# Patient Record
Sex: FEMALE | Race: WHITE
[De-identification: names, ages, dates, MRNs, and addresses within clinical notes are randomized per-mention and may not be internally consistent; named-entity substitution may affect disease eponyms.]

---

## 2017-02-07 NOTE — ERD
ER Documentation


Chief Complaint


Date/Time


DATE: 2/7/17 


TIME: 03:11


Chief Complaint


itchy generalized rash/ hoarse voice/ lightheaded 1 hr PTA, no distress





HPI


21-year-old female presents here in emergency department for complaint of rash 

all over the body and itching, itching in the throat, hoarseness of the voice 

today. Patient broke out in rash after eating candy. Patient denies any 

stridor. Patient started to feel lightheaded and dizzy 30 minutes prior to 

arrival, patient is complaining of headache, throbbing pain, for/10 scale, 

accompanied with dizziness and lightheadedness. Patient denies any nausea or 

vomiting. Patient takes drugs regularly, took heroine and crystal 

methamphetamine today.





ROS


All systems reviewed and are negative except as per history of present illness.





Medications


Home Meds


No Active Prescriptions or Reported Meds





Allergies


Allergies:  


Coded Allergies:  


     No Known Allergy (Unverified , 3/7/16)





PMhx/Soc


History of Surgery:  No


Anesthesia Reaction:  No


Hx Neurological Disorder:  No


Hx Respiratory Disorders:  No


Hx Cardiac Disorders:  No


Hx Psychiatric Problems:  Yes (pt states depressed easily)


Hx Miscellaneous Medical Probl:  No


Hx Alcohol Use:  Yes (has drank in past)


Hx Substance Use:  Yes (oxycotin last used 3 weeks ago)


Hx Tobacco Use:  Yes (last smoked 3 weeks ago)





Physical Exam


Vitals





Vital Signs








  Date Time  Temp Pulse Resp B/P Pulse Ox O2 Delivery O2 Flow Rate FiO2


 


2/6/17 23:34 97.2 96 20 136/62 97   








Physical Exam


Const:  []


Head:   Atraumatic 


Eyes:    Normal Conjunctiva


ENT:    Normal External Ears, Nose and Mouth.


Neck:               Full range of motion..~ No meningismus.


Resp:    Clear to auscultation bilaterally


Cardio:    Regular rate and rhythm, no murmurs


Abd:    Soft, non tender, non distended. Normal bowel sounds


Skin:    No petechiae or rashes


Back:    No midline or flank tenderness


Ext:    No cyanosis, or edema


Neur:    Awake and alert


Psych:    Normal Mood and Affect


Result Diagram:  


2/7/17 0320                                                                    

            2/7/17 0320





Results 24 hrs





 Laboratory Tests








Test


  2/7/17


03:20


 


Alanine Aminotransferase


(ALT/SGPT) 20IU/L 


 


 


Albumin 4.1g/dl 


 


Albumin/Globulin Ratio 1.02 


 


Alkaline Phosphatase 113IU/L 


 


Anion Gap 18 


 


Aspartate Amino Transf


(AST/SGOT) 22IU/L 


 


 


Basophils # 0.210^3/ul 


 


Basophils % 1.4% 


 


Blood Morphology Comment  


 


Blood Urea Nitrogen 8mg/dl 


 


Calcium Level 9.5mg/dl 


 


Carbon Dioxide Level 30mmol/L 


 


Chloride Level 98mmol/L 


 


Creatinine 0.56mg/dl 


 


Direct Bilirubin 0.00mg/dl 


 


Eosinophils # 0.210^3/ul 


 


Eosinophils % 2.2% 


 


Globulin 4.00g/dl 


 


Glucose Level 82mg/dl 


 


Hematocrit 36.4% 


 


Hemoglobin 12.1g/dl 


 


Indirect Bilirubin 0.0mg/dl 


 


Lymphocytes # 3.210^3/ul 


 


Lymphocytes % 29.8% 


 


Mean Corpuscular Hemoglobin 27.9pg 


 


Mean Corpuscular Hemoglobin


Concent 33.3g/dl 


 


 


Mean Corpuscular Volume 83.8fl 


 


Mean Platelet Volume 7.2fl 


 


Monocytes # 1.210^3/ul 


 


Monocytes % 11.3% 


 


Neutrophils # 6.010^3/ul 


 


Neutrophils % 55.3% 


 


Nucleated Red Blood Cells # 0.010^3/ul 


 


Nucleated Red Blood Cells % 0.0/100WBC 


 


Platelet Count 00641^3/UL 


 


Potassium Level 3.3mmol/L 


 


Red Blood Count 4.3510^6/ul 


 


Red Cell Distribution Width 15.6% 


 


Sodium Level 143mmol/L 


 


Total Bilirubin 0.0mg/dl 


 


Total Protein 8.1g/dl 


 


White Blood Count 10.810^3/ul 








 Current Medications








 Medications


  (Trade)  Dose


 Ordered  Sig/Sun


 Route


 PRN Reason  Start Time


 Stop Time Status Last Admin


Dose Admin


 


 Diphenhydramine


 HCl


  (Benadryl)  50 mg  ONCE  ONCE


 IM


   2/7/17 03:30


 2/7/17 03:31 DC 2/7/17 03:33


 





Benadryl was given here in emergency department to help with itching and the 

rash.


EKG was done, read by me and is normal sinus rhythm at a rate of 82, normal axis

, there is no ST changes or changes in the EKG that indicates any cardiac 

emergencies at this time. Patient's EKG was also reviewed by Dr. Cote. 

Impression: no acute findings on EKG





PROCEDURE:   CT Brain without contrast. 


 


CLINICAL INDICATION: Headache and dizziness


 


TECHNIQUE:   Axial images from the skull base through the vertex without IV 

contrast.  Multiplanar reformatted images were made.  Images were reviewed on a 

PACS workstation.  The CTDIvol is 45.01 mGy and the DLP is 720.23 mGycm.  One 

or more of the following dose reduction techniques were used: automated 

exposure control, adjustment of the mA and/or kV according to patient size, or 

use of iterative reconstruction technique.


 


COMPARISON:   06/01/2013 


 


FINDINGS:


The ventricles and cisterns are normal for age.  There is no evidence for 

territorial infarction or intracranial hemorrhage.  No mass or midline shift is 

seen.  No extra-axial fluid collection is seen.   The visualized paranasal 

sinuses and mastoids are clear. 


 


IMPRESSION:


No definite acute intracranial abnormality.


 


 


 


RPTAT: HLBE


_____________________________________________ 


Tiarra Ordaz Physician           Date    Time 


Electronically viewed and signed by Tiarra Ordaz Physician on 02/07/2017 04

:06 


 


D:  02/07/2017 04:06  T:  02/07/2017 04:06


LE/





Procedures/MDM


Medical Decision Making: Patient's symptoms of lightheadedness and dizziness 

nonspecific at this time, possibly related to drug abuse, patient's been on 

heroin and crystal meth. Can be side effects. There is low suspicion for 

neurological emergencies at this time since patients neurologic exam is normal. 

Patient did not have any altered level consciousness, vomiting, changes in 

balance or memory after incident. Patients CT scan of the head does not show 

any neurological emergencies at this time. Patient's rash all over the body 

consistent with urticaria, allergic reaction, unknown source at this time. No 

symptoms of anaphylactic shock. No symptoms of respiratory distress. No 

angioedema noted. No symptoms of any contagious rash at this time. Patient was 

given for Benadryl, prednisone, is advised to follow-up with primary care 

doctor to 3 days for reevaluation of symptoms. Patient was advised to return to 

emergency department for worsening symptoms





Departure


Diagnosis:  


 Primary Impression:  


 Dizziness


 Additional Impression:  


 Urticaria


Condition:  Stable


Patient Instructions:  Dizziness, Unk Cause, Hives











CORINNE ARRIAGA NP Feb 7, 2017 03:14

## 2017-02-07 NOTE — RADRPT
PROCEDURE:   CT Brain without contrast. 

 

CLINICAL INDICATION: Headache and dizziness

 

TECHNIQUE:   Axial images from the skull base through the vertex without IV contrast.  Multiplanar r
eformatted images were made.  Images were reviewed on a PACS workstation.  The CTDIvol is 45.01 mGy 
and the DLP is 720.23 mGycm.  One or more of the following dose reduction techniques were used: auto
mated exposure control, adjustment of the mA and/or kV according to patient size, or use of iterativ
e reconstruction technique.

 

COMPARISON:   06/01/2013 

 

FINDINGS:

The ventricles and cisterns are normal for age.  There is no evidence for territorial infarction or 
intracranial hemorrhage.  No mass or midline shift is seen.  No extra-axial fluid collection is seen
.   The visualized paranasal sinuses and mastoids are clear. 

 

IMPRESSION:

No definite acute intracranial abnormality.

 

 

 

RPTAT: HLBE

_____________________________________________ 

Physician Hill           Date    Time 

Electronically viewed and signed by Physician Hill on 02/07/2017 04:06 

 

D:  02/07/2017 04:06  T:  02/07/2017 04:06

LUCIO/

## 2018-06-10 ENCOUNTER — HOSPITAL ENCOUNTER (EMERGENCY)
Age: 23
LOS: 1 days | Discharge: HOME | End: 2018-06-11

## 2018-06-10 ENCOUNTER — HOSPITAL ENCOUNTER (EMERGENCY)
Dept: HOSPITAL 91 - E/R | Age: 23
LOS: 1 days | Discharge: HOME | End: 2018-06-11
Payer: MEDICAID
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
## 2018-06-10 DIAGNOSIS — S41.141A: Primary | ICD-10-CM

## 2018-06-10 DIAGNOSIS — F17.210: ICD-10-CM

## 2018-06-10 PROCEDURE — 73080 X-RAY EXAM OF ELBOW: CPT

## 2018-06-10 PROCEDURE — 99283 EMERGENCY DEPT VISIT LOW MDM: CPT

## 2018-07-15 ENCOUNTER — HOSPITAL ENCOUNTER (INPATIENT)
Dept: HOSPITAL 91 - E/R | Age: 23
LOS: 3 days | Discharge: LEFT BEFORE DISCHARGE | DRG: 872 | End: 2018-07-18
Payer: MEDICAID

## 2018-07-15 DIAGNOSIS — F11.10: ICD-10-CM

## 2018-07-15 DIAGNOSIS — D64.9: ICD-10-CM

## 2018-07-15 DIAGNOSIS — L03.113: ICD-10-CM

## 2018-07-15 DIAGNOSIS — F17.200: ICD-10-CM

## 2018-07-15 DIAGNOSIS — A41.9: Primary | ICD-10-CM

## 2018-07-15 DIAGNOSIS — L02.413: ICD-10-CM

## 2018-07-15 DIAGNOSIS — F15.10: ICD-10-CM

## 2018-07-15 DIAGNOSIS — N39.0: ICD-10-CM

## 2018-07-15 PROCEDURE — 83540 ASSAY OF IRON: CPT

## 2018-07-15 PROCEDURE — 84439 ASSAY OF FREE THYROXINE: CPT

## 2018-07-15 PROCEDURE — 80202 ASSAY OF VANCOMYCIN: CPT

## 2018-07-15 PROCEDURE — 87070 CULTURE OTHR SPECIMN AEROBIC: CPT

## 2018-07-15 PROCEDURE — 85651 RBC SED RATE NONAUTOMATED: CPT

## 2018-07-15 PROCEDURE — 84100 ASSAY OF PHOSPHORUS: CPT

## 2018-07-15 PROCEDURE — 87075 CULTR BACTERIA EXCEPT BLOOD: CPT

## 2018-07-15 PROCEDURE — 80061 LIPID PANEL: CPT

## 2018-07-15 PROCEDURE — 81001 URINALYSIS AUTO W/SCOPE: CPT

## 2018-07-15 PROCEDURE — 87040 BLOOD CULTURE FOR BACTERIA: CPT

## 2018-07-15 PROCEDURE — 80048 BASIC METABOLIC PNL TOTAL CA: CPT

## 2018-07-15 PROCEDURE — 80307 DRUG TEST PRSMV CHEM ANLYZR: CPT

## 2018-07-15 PROCEDURE — 87086 URINE CULTURE/COLONY COUNT: CPT

## 2018-07-15 PROCEDURE — 73200 CT UPPER EXTREMITY W/O DYE: CPT

## 2018-07-15 PROCEDURE — 83036 HEMOGLOBIN GLYCOSYLATED A1C: CPT

## 2018-07-15 PROCEDURE — 93005 ELECTROCARDIOGRAM TRACING: CPT

## 2018-07-15 PROCEDURE — 76536 US EXAM OF HEAD AND NECK: CPT

## 2018-07-15 PROCEDURE — 84443 ASSAY THYROID STIM HORMONE: CPT

## 2018-07-15 PROCEDURE — 83605 ASSAY OF LACTIC ACID: CPT

## 2018-07-15 PROCEDURE — 99285 EMERGENCY DEPT VISIT HI MDM: CPT

## 2018-07-15 PROCEDURE — 71045 X-RAY EXAM CHEST 1 VIEW: CPT

## 2018-07-15 PROCEDURE — 84703 CHORIONIC GONADOTROPIN ASSAY: CPT

## 2018-07-15 PROCEDURE — 80053 COMPREHEN METABOLIC PANEL: CPT

## 2018-07-15 PROCEDURE — 85025 COMPLETE CBC W/AUTO DIFF WBC: CPT

## 2018-07-15 PROCEDURE — 82728 ASSAY OF FERRITIN: CPT

## 2018-07-15 PROCEDURE — 85610 PROTHROMBIN TIME: CPT

## 2018-07-15 PROCEDURE — 85730 THROMBOPLASTIN TIME PARTIAL: CPT

## 2018-07-15 PROCEDURE — 73090 X-RAY EXAM OF FOREARM: CPT

## 2018-07-15 PROCEDURE — 86140 C-REACTIVE PROTEIN: CPT

## 2018-07-15 PROCEDURE — 84484 ASSAY OF TROPONIN QUANT: CPT

## 2018-07-15 PROCEDURE — 36415 COLL VENOUS BLD VENIPUNCTURE: CPT

## 2018-07-15 PROCEDURE — 83735 ASSAY OF MAGNESIUM: CPT

## 2018-07-16 ENCOUNTER — HOSPITAL ENCOUNTER (INPATIENT)
Age: 23
LOS: 2 days | Discharge: LEFT BEFORE DISCHARGE | DRG: 872 | End: 2018-07-18

## 2018-07-16 LAB
% IRON SATURATION: 6 % SAT (ref 22–52)
ABNORMAL IP MESSAGE: 1
ABNORMAL IP MESSAGE: 1
ADD MAN DIFF?: NO
ADD MAN DIFF?: NO
ADD UMIC: YES
ALANINE AMINOTRANSFERASE: 17 IU/L (ref 13–69)
ALANINE AMINOTRANSFERASE: 18 IU/L (ref 13–69)
ALBUMIN/GLOBULIN RATIO: 1.02
ALBUMIN/GLOBULIN RATIO: 1.09
ALBUMIN: 3.6 G/DL (ref 3.3–4.9)
ALBUMIN: 3.7 G/DL (ref 3.3–4.9)
ALKALINE PHOSPHATASE: 113 IU/L (ref 42–121)
ALKALINE PHOSPHATASE: 97 IU/L (ref 42–121)
AMPHETAMINE/METHAMPHETAMINE: POSITIVE
ANION GAP: 13 (ref 8–16)
ANION GAP: 14 (ref 8–16)
ASPARTATE AMINO TRANSFERASE: 16 IU/L (ref 15–46)
ASPARTATE AMINO TRANSFERASE: 22 IU/L (ref 15–46)
BARBITURATES: NEGATIVE
BASOPHIL #: 0 10^3/UL (ref 0–0.1)
BASOPHIL #: 0 10^3/UL (ref 0–0.1)
BASOPHILS %: 0.3 % (ref 0–2)
BASOPHILS %: 0.3 % (ref 0–2)
BENZODIAZEPINES: NEGATIVE
BILIRUBIN,DIRECT: 0 MG/DL (ref 0–0.2)
BILIRUBIN,DIRECT: 0 MG/DL (ref 0–0.2)
BILIRUBIN,TOTAL: 0.3 MG/DL (ref 0.2–1.3)
BILIRUBIN,TOTAL: 0.4 MG/DL (ref 0.2–1.3)
BLOOD UREA NITROGEN: 6 MG/DL (ref 7–20)
BLOOD UREA NITROGEN: 7 MG/DL (ref 7–20)
C-REACTIVE PROTEIN: 14.6 MG/DL (ref 0–0.9)
CALCIUM: 8.3 MG/DL (ref 8.4–10.2)
CALCIUM: 8.7 MG/DL (ref 8.4–10.2)
CANNABINOIDS: NEGATIVE
CARBON DIOXIDE: 25 MMOL/L (ref 21–31)
CARBON DIOXIDE: 26 MMOL/L (ref 21–31)
CHLORIDE: 101 MMOL/L (ref 97–110)
CHLORIDE: 105 MMOL/L (ref 97–110)
CHOL/HDL RATIO: 3.6 RATIO
CHOLESTEROL: 76 MG/DL (ref 100–200)
COCAINE: NEGATIVE
CREATININE: 0.58 MG/DL (ref 0.44–1)
CREATININE: 0.59 MG/DL (ref 0.44–1)
EOSINOPHILS #: 0 10^3/UL (ref 0–0.5)
EOSINOPHILS #: 0 10^3/UL (ref 0–0.5)
EOSINOPHILS %: 0.3 % (ref 0–7)
EOSINOPHILS %: 0.3 % (ref 0–7)
ERYTHROCYTE SEDIMENTATION RATE: 56 MM/HR (ref 0–20)
ETHANOL: < 10 MG/DL
FERRITIN: 86.6 NG/ML (ref 6.2–137)
FREE T4 (FREE THYROXINE): 1.21 NG/DL (ref 0.79–2.35)
GLOBULIN: 3.3 G/DL (ref 1.3–3.2)
GLOBULIN: 3.6 G/DL (ref 1.3–3.2)
GLUCOSE: 112 MG/DL (ref 70–220)
GLUCOSE: 91 MG/DL (ref 70–220)
HDL CHOLESTEROL: 21 MG/DL (ref 33–83)
HEMATOCRIT: 28.3 % (ref 37–47)
HEMATOCRIT: 29.6 % (ref 37–47)
HEMOGLOBIN A1C: 5.4 % (ref 0–5.9)
HEMOGLOBIN: 9.3 G/DL (ref 12–16)
HEMOGLOBIN: 9.5 G/DL (ref 12–16)
INR: 1.31
IRON: 16 UG/DL (ref 35–150)
LACTIC ACID: 1 MMOL/L (ref 0.5–2)
LACTIC ACID: 1 MMOL/L (ref 0.5–2)
LACTIC ACID: 1.1 MMOL/L (ref 0.5–2)
LDL CHOLESTEROL,CALCULATED: 41 MG/DL
LYMPHOCYTES #: 2.6 10^3/UL (ref 0.8–2.9)
LYMPHOCYTES #: 2.7 10^3/UL (ref 0.8–2.9)
LYMPHOCYTES %: 16.5 % (ref 15–51)
LYMPHOCYTES %: 17.8 % (ref 15–51)
MAGNESIUM: 1.8 MG/DL (ref 1.7–2.5)
MEAN CORPUSCULAR HEMOGLOBIN: 27 PG (ref 29–33)
MEAN CORPUSCULAR HEMOGLOBIN: 27.1 PG (ref 29–33)
MEAN CORPUSCULAR HGB CONC: 32.1 G/DL (ref 32–37)
MEAN CORPUSCULAR HGB CONC: 32.9 G/DL (ref 32–37)
MEAN CORPUSCULAR VOLUME: 82.5 FL (ref 82–101)
MEAN CORPUSCULAR VOLUME: 84.1 FL (ref 82–101)
MEAN PLATELET VOLUME: 9.2 FL (ref 7.4–10.4)
MEAN PLATELET VOLUME: 9.7 FL (ref 7.4–10.4)
MONOCYTE #: 1.8 10^3/UL (ref 0.3–0.9)
MONOCYTE #: 1.9 10^3/UL (ref 0.3–0.9)
MONOCYTES %: 11.8 % (ref 0–11)
MONOCYTES %: 12.1 % (ref 0–11)
NEUTROPHIL #: 10.6 10^3/UL (ref 1.6–7.5)
NEUTROPHIL #: 10.9 10^3/UL (ref 1.6–7.5)
NEUTROPHILS %: 68.9 % (ref 39–77)
NEUTROPHILS %: 70.6 % (ref 39–77)
NUCLEATED RED BLOOD CELLS #: 0 10^3/UL (ref 0–0)
NUCLEATED RED BLOOD CELLS #: 0 10^3/UL (ref 0–0)
NUCLEATED RED BLOOD CELLS%: 0 /100WBC (ref 0–0)
NUCLEATED RED BLOOD CELLS%: 0 /100WBC (ref 0–0)
OPIATES: POSITIVE
PARTIAL THROMBOPLASTIN TIME: 38.3 SEC (ref 25–35)
PLATELET COUNT: 377 10^3/UL (ref 140–415)
PLATELET COUNT: 386 10^3/UL (ref 140–415)
POSITIVE DIFF: (no result)
POSITIVE DIFF: (no result)
POTASSIUM: 3.2 MMOL/L (ref 3.5–5.1)
POTASSIUM: 3.7 MMOL/L (ref 3.5–5.1)
PROTIME: 16.5 SEC (ref 11.9–14.9)
PT RATIO: 1.3
RED BLOOD COUNT: 3.43 10^6/UL (ref 4.2–5.4)
RED BLOOD COUNT: 3.52 10^6/UL (ref 4.2–5.4)
RED CELL DISTRIBUTION WIDTH: 12.2 % (ref 11.5–14.5)
RED CELL DISTRIBUTION WIDTH: 12.4 % (ref 11.5–14.5)
SODIUM: 138 MMOL/L (ref 135–144)
SODIUM: 139 MMOL/L (ref 135–144)
THYROID STIMULATING HORMONE: 0.28 MIU/L (ref 0.47–4.68)
TOTAL IRON BINDING CAPACITY: 251 UG/DL (ref 241–421)
TOTAL PROTEIN: 6.9 G/DL (ref 6.1–8.1)
TOTAL PROTEIN: 7.3 G/DL (ref 6.1–8.1)
TRIGLYCERIDES: 69 MG/DL (ref 0–149)
TROPONIN-I: < 0.01 NG/ML (ref 0–0.12)
UR ASCORBIC ACID: NEGATIVE MG/DL
UR BACTERIA: (no result) /HPF
UR BILIRUBIN (DIP): (no result) MG/DL
UR BLOOD (DIP): NEGATIVE MG/DL
UR CLARITY: (no result)
UR COLOR: (no result)
UR GLUCOSE (DIP): NEGATIVE MG/DL
UR KETONES (DIP): NEGATIVE MG/DL
UR LEUKOCYTE ESTERASE (DIP): (no result) LEU/UL
UR MUCUS: (no result) /HPF
UR NITRITE (DIP): NEGATIVE MG/DL
UR PH (DIP): 5 (ref 5–9)
UR RBC: 8 /HPF (ref 0–5)
UR SPECIFIC GRAVITY (DIP): 1.02 (ref 1–1.03)
UR SQUAMOUS EPITHELIAL CELL: (no result) /HPF
UR TOTAL PROTEIN (DIP): (no result) MG/DL
UR UROBILINOGEN (DIP): (no result) MG/DL
UR WBC: 16 /HPF (ref 0–5)
WHITE BLOOD COUNT: 15.4 10^3/UL (ref 4.8–10.8)
WHITE BLOOD COUNT: 15.5 10^3/UL (ref 4.8–10.8)

## 2018-07-16 PROCEDURE — 0H9BXZZ DRAINAGE OF RIGHT UPPER ARM SKIN, EXTERNAL APPROACH: ICD-10-PCS

## 2018-07-16 RX ADMIN — THIAMINE HYDROCHLORIDE 1 MLS/HR: 100 INJECTION, SOLUTION INTRAMUSCULAR; INTRAVENOUS at 04:11

## 2018-07-16 RX ADMIN — VANCOMYCIN HYDROCHLORIDE 1 MLS/HR: 1 INJECTION, POWDER, LYOPHILIZED, FOR SOLUTION INTRAVENOUS at 01:30

## 2018-07-16 RX ADMIN — THIAMINE HYDROCHLORIDE 1 MLS/HR: 100 INJECTION, SOLUTION INTRAMUSCULAR; INTRAVENOUS at 21:25

## 2018-07-16 RX ADMIN — POTASSIUM CHLORIDE 1 MEQ: 1500 TABLET, EXTENDED RELEASE ORAL at 02:46

## 2018-07-16 RX ADMIN — VANCOMYCIN HYDROCHLORIDE 1 MLS/HR: 500 INJECTION, POWDER, LYOPHILIZED, FOR SOLUTION INTRAVENOUS at 09:02

## 2018-07-16 RX ADMIN — PIPERACILLIN SODIUM AND TAZOBACTAM SODIUM 1 MLS/HR: 3; .375 INJECTION, POWDER, LYOPHILIZED, FOR SOLUTION INTRAVENOUS at 17:24

## 2018-07-16 RX ADMIN — BUPIVACAINE HYDROCHLORIDE AND EPINEPHRINE BITARTRATE 1 ML: 2.5; .005 INJECTION, SOLUTION INFILTRATION; PERINEURAL at 12:41

## 2018-07-16 RX ADMIN — SODIUM CHLORIDE 1 ML: 9 INJECTION, SOLUTION INTRAMUSCULAR; INTRAVENOUS; SUBCUTANEOUS at 11:40

## 2018-07-16 RX ADMIN — PIPERACILLIN SODIUM AND TAZOBACTAM SODIUM 1 MLS/HR: 3; .375 INJECTION, POWDER, LYOPHILIZED, FOR SOLUTION INTRAVENOUS at 05:54

## 2018-07-16 RX ADMIN — PIPERACILLIN SODIUM AND TAZOBACTAM SODIUM 1 MLS/HR: 3; .375 INJECTION, POWDER, LYOPHILIZED, FOR SOLUTION INTRAVENOUS at 00:24

## 2018-07-16 RX ADMIN — ACETAMINOPHEN 1 MG: 325 TABLET, FILM COATED ORAL at 10:40

## 2018-07-16 RX ADMIN — IOHEXOL 1 ML: 300 INJECTION, SOLUTION INTRAVENOUS at 11:41

## 2018-07-16 RX ADMIN — PIPERACILLIN SODIUM AND TAZOBACTAM SODIUM 1 MLS/HR: 3; .375 INJECTION, POWDER, LYOPHILIZED, FOR SOLUTION INTRAVENOUS at 12:00

## 2018-07-16 RX ADMIN — MORPHINE SULFATE 1 MG: 2 INJECTION, SOLUTION INTRAMUSCULAR; INTRAVENOUS at 06:46

## 2018-07-16 RX ADMIN — VANCOMYCIN HYDROCHLORIDE 1 MLS/HR: 500 INJECTION, POWDER, LYOPHILIZED, FOR SOLUTION INTRAVENOUS at 15:52

## 2018-07-16 RX ADMIN — MORPHINE SULFATE 1 MG: 2 INJECTION, SOLUTION INTRAMUSCULAR; INTRAVENOUS at 23:26

## 2018-07-16 RX ADMIN — THIAMINE HYDROCHLORIDE 1 ML: 100 INJECTION, SOLUTION INTRAMUSCULAR; INTRAVENOUS at 00:24

## 2018-07-16 RX ADMIN — MORPHINE SULFATE 1 MG: 2 INJECTION, SOLUTION INTRAMUSCULAR; INTRAVENOUS at 02:43

## 2018-07-16 RX ADMIN — VASOPRESSIN 1: 20 INJECTION, SOLUTION INTRAMUSCULAR; SUBCUTANEOUS at 11:40

## 2018-07-16 RX ADMIN — THIAMINE HYDROCHLORIDE 1 MLS/HR: 100 INJECTION, SOLUTION INTRAMUSCULAR; INTRAVENOUS at 01:21

## 2018-07-16 RX ADMIN — HYDROMORPHONE HYDROCHLORIDE 1 MG: 2 INJECTION INTRAMUSCULAR; INTRAVENOUS; SUBCUTANEOUS at 13:20

## 2018-07-17 LAB
ADD MAN DIFF?: NO
ANION GAP: 14 (ref 8–16)
BASOPHIL #: 0 10^3/UL (ref 0–0.1)
BASOPHILS %: 0.2 % (ref 0–2)
BLOOD UREA NITROGEN: 6 MG/DL (ref 7–20)
CALCIUM: 8.3 MG/DL (ref 8.4–10.2)
CARBON DIOXIDE: 21 MMOL/L (ref 21–31)
CHLORIDE: 111 MMOL/L (ref 97–110)
CREATININE: 0.45 MG/DL (ref 0.44–1)
EOSINOPHILS #: 0.3 10^3/UL (ref 0–0.5)
EOSINOPHILS %: 2.6 % (ref 0–7)
GLUCOSE: 93 MG/DL (ref 70–220)
HEMATOCRIT: 26.5 % (ref 37–47)
HEMOGLOBIN: 8.6 G/DL (ref 12–16)
LYMPHOCYTES #: 2 10^3/UL (ref 0.8–2.9)
LYMPHOCYTES %: 20.5 % (ref 15–51)
MAGNESIUM: 1.8 MG/DL (ref 1.7–2.5)
MEAN CORPUSCULAR HEMOGLOBIN: 27.2 PG (ref 29–33)
MEAN CORPUSCULAR HGB CONC: 32.5 G/DL (ref 32–37)
MEAN CORPUSCULAR VOLUME: 83.9 FL (ref 82–101)
MEAN PLATELET VOLUME: 9.7 FL (ref 7.4–10.4)
MONOCYTE #: 0.8 10^3/UL (ref 0.3–0.9)
MONOCYTES %: 7.9 % (ref 0–11)
NEUTROPHIL #: 6.6 10^3/UL (ref 1.6–7.5)
NEUTROPHILS %: 68.4 % (ref 39–77)
NUCLEATED RED BLOOD CELLS #: 0 10^3/UL (ref 0–0)
NUCLEATED RED BLOOD CELLS%: 0 /100WBC (ref 0–0)
PHOSPHORUS: 3.4 MG/DL (ref 2.5–4.9)
PLATELET COUNT: 363 10^3/UL (ref 140–415)
POTASSIUM: 4 MMOL/L (ref 3.5–5.1)
RED BLOOD COUNT: 3.16 10^6/UL (ref 4.2–5.4)
RED CELL DISTRIBUTION WIDTH: 12.6 % (ref 11.5–14.5)
SODIUM: 142 MMOL/L (ref 135–144)
VANCOMYCIN,TROUGH: 6.5 UG/ML (ref 10–20)
WHITE BLOOD COUNT: 9.6 10^3/UL (ref 4.8–10.8)

## 2018-07-17 RX ADMIN — OXYCODONE HYDROCHLORIDE AND ACETAMINOPHEN 1 TAB: 5; 325 TABLET ORAL at 09:11

## 2018-07-17 RX ADMIN — PIPERACILLIN SODIUM AND TAZOBACTAM SODIUM 1 MLS/HR: 3; .375 INJECTION, POWDER, LYOPHILIZED, FOR SOLUTION INTRAVENOUS at 17:35

## 2018-07-17 RX ADMIN — VANCOMYCIN HYDROCHLORIDE 1 MLS/HR: 500 INJECTION, POWDER, LYOPHILIZED, FOR SOLUTION INTRAVENOUS at 01:40

## 2018-07-17 RX ADMIN — THIAMINE HYDROCHLORIDE 1 MLS/HR: 100 INJECTION, SOLUTION INTRAMUSCULAR; INTRAVENOUS at 14:55

## 2018-07-17 RX ADMIN — MORPHINE SULFATE 1 MG: 2 INJECTION, SOLUTION INTRAMUSCULAR; INTRAVENOUS at 08:02

## 2018-07-17 RX ADMIN — THIAMINE HYDROCHLORIDE 1 MLS/HR: 100 INJECTION, SOLUTION INTRAMUSCULAR; INTRAVENOUS at 07:21

## 2018-07-17 RX ADMIN — VANCOMYCIN HYDROCHLORIDE 1 MLS/HR: 500 INJECTION, POWDER, LYOPHILIZED, FOR SOLUTION INTRAVENOUS at 14:54

## 2018-07-17 RX ADMIN — PIPERACILLIN SODIUM AND TAZOBACTAM SODIUM 1 MLS/HR: 3; .375 INJECTION, POWDER, LYOPHILIZED, FOR SOLUTION INTRAVENOUS at 00:36

## 2018-07-17 RX ADMIN — PIPERACILLIN SODIUM AND TAZOBACTAM SODIUM 1 MLS/HR: 3; .375 INJECTION, POWDER, LYOPHILIZED, FOR SOLUTION INTRAVENOUS at 05:24

## 2018-07-17 RX ADMIN — VANCOMYCIN HYDROCHLORIDE 1 MLS/HR: 500 INJECTION, POWDER, LYOPHILIZED, FOR SOLUTION INTRAVENOUS at 06:25

## 2018-07-17 RX ADMIN — PIPERACILLIN SODIUM AND TAZOBACTAM SODIUM 1 MLS/HR: 3; .375 INJECTION, POWDER, LYOPHILIZED, FOR SOLUTION INTRAVENOUS at 11:55

## 2018-07-17 RX ADMIN — VANCOMYCIN HYDROCHLORIDE 1 MLS/HR: 500 INJECTION, POWDER, LYOPHILIZED, FOR SOLUTION INTRAVENOUS at 23:11

## 2018-07-18 LAB
ADD MAN DIFF?: NO
ANION GAP: 13 (ref 8–16)
BASOPHIL #: 0 10^3/UL (ref 0–0.1)
BASOPHILS %: 0.5 % (ref 0–2)
BLOOD UREA NITROGEN: 5 MG/DL (ref 7–20)
CALCIUM: 8.3 MG/DL (ref 8.4–10.2)
CARBON DIOXIDE: 23 MMOL/L (ref 21–31)
CHLORIDE: 111 MMOL/L (ref 97–110)
CREATININE: 0.53 MG/DL (ref 0.44–1)
EOSINOPHILS #: 0.7 10^3/UL (ref 0–0.5)
EOSINOPHILS %: 8.7 % (ref 0–7)
GLUCOSE: 87 MG/DL (ref 70–220)
HEMATOCRIT: 25.9 % (ref 37–47)
HEMOGLOBIN: 8.1 G/DL (ref 12–16)
LYMPHOCYTES #: 3.4 10^3/UL (ref 0.8–2.9)
LYMPHOCYTES %: 42.7 % (ref 15–51)
MAGNESIUM: 1.8 MG/DL (ref 1.7–2.5)
MEAN CORPUSCULAR HEMOGLOBIN: 26.5 PG (ref 29–33)
MEAN CORPUSCULAR HGB CONC: 31.3 G/DL (ref 32–37)
MEAN CORPUSCULAR VOLUME: 84.6 FL (ref 82–101)
MEAN PLATELET VOLUME: 10.1 FL (ref 7.4–10.4)
MONOCYTE #: 0.7 10^3/UL (ref 0.3–0.9)
MONOCYTES %: 8.5 % (ref 0–11)
NEUTROPHIL #: 3.1 10^3/UL (ref 1.6–7.5)
NEUTROPHILS %: 39.3 % (ref 39–77)
NUCLEATED RED BLOOD CELLS #: 0 10^3/UL (ref 0–0)
NUCLEATED RED BLOOD CELLS%: 0 /100WBC (ref 0–0)
PHOSPHORUS: 3.5 MG/DL (ref 2.5–4.9)
PLATELET COUNT: 386 10^3/UL (ref 140–415)
POTASSIUM: 3.8 MMOL/L (ref 3.5–5.1)
RED BLOOD COUNT: 3.06 10^6/UL (ref 4.2–5.4)
RED CELL DISTRIBUTION WIDTH: 12.9 % (ref 11.5–14.5)
SODIUM: 143 MMOL/L (ref 135–144)
WHITE BLOOD COUNT: 7.9 10^3/UL (ref 4.8–10.8)

## 2018-07-18 RX ADMIN — THIAMINE HYDROCHLORIDE 1 MLS/HR: 100 INJECTION, SOLUTION INTRAMUSCULAR; INTRAVENOUS at 10:41

## 2018-07-18 RX ADMIN — MORPHINE SULFATE 1 MG: 2 INJECTION, SOLUTION INTRAMUSCULAR; INTRAVENOUS at 01:47

## 2018-07-18 RX ADMIN — VANCOMYCIN HYDROCHLORIDE 1 MLS/HR: 500 INJECTION, POWDER, LYOPHILIZED, FOR SOLUTION INTRAVENOUS at 06:52

## 2018-07-18 RX ADMIN — THIAMINE HYDROCHLORIDE 1 MLS/HR: 100 INJECTION, SOLUTION INTRAMUSCULAR; INTRAVENOUS at 13:21

## 2018-07-18 RX ADMIN — PIPERACILLIN SODIUM AND TAZOBACTAM SODIUM 1 MLS/HR: 3; .375 INJECTION, POWDER, LYOPHILIZED, FOR SOLUTION INTRAVENOUS at 06:17

## 2018-07-18 RX ADMIN — THIAMINE HYDROCHLORIDE 1 MLS/HR: 100 INJECTION, SOLUTION INTRAMUSCULAR; INTRAVENOUS at 03:21

## 2018-07-18 RX ADMIN — PIPERACILLIN SODIUM AND TAZOBACTAM SODIUM 1 MLS/HR: 3; .375 INJECTION, POWDER, LYOPHILIZED, FOR SOLUTION INTRAVENOUS at 01:15
